# Patient Record
Sex: MALE | Race: WHITE | Employment: FULL TIME | URBAN - METROPOLITAN AREA
[De-identification: names, ages, dates, MRNs, and addresses within clinical notes are randomized per-mention and may not be internally consistent; named-entity substitution may affect disease eponyms.]

---

## 2023-09-10 ENCOUNTER — APPOINTMENT (OUTPATIENT)
Dept: GENERAL RADIOLOGY | Facility: HOSPITAL | Age: 33
End: 2023-09-10
Payer: COMMERCIAL

## 2023-09-10 ENCOUNTER — HOSPITAL ENCOUNTER (EMERGENCY)
Facility: HOSPITAL | Age: 33
Discharge: HOME | End: 2023-09-10
Payer: COMMERCIAL

## 2023-09-10 ENCOUNTER — APPOINTMENT (OUTPATIENT)
Dept: GENERAL RADIOLOGY | Facility: HOSPITAL | Age: 33
End: 2023-09-10
Attending: NURSE PRACTITIONER
Payer: COMMERCIAL

## 2023-09-10 VITALS
BODY MASS INDEX: 28.25 KG/M2 | TEMPERATURE: 97 F | HEIGHT: 67 IN | RESPIRATION RATE: 18 BRPM | SYSTOLIC BLOOD PRESSURE: 126 MMHG | DIASTOLIC BLOOD PRESSURE: 99 MMHG | OXYGEN SATURATION: 97 % | WEIGHT: 180 LBS | HEART RATE: 80 BPM
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
## 2023-09-10 DIAGNOSIS — S39.012A LUMBAR STRAIN, INITIAL ENCOUNTER: Primary | ICD-10-CM

## 2023-09-10 PROCEDURE — 73502 X-RAY EXAM HIP UNI 2-3 VIEWS: CPT

## 2023-09-10 PROCEDURE — 99283 EMERGENCY DEPT VISIT LOW MDM: CPT

## 2023-09-10 PROCEDURE — 99284 EMERGENCY DEPT VISIT MOD MDM: CPT

## 2023-09-10 PROCEDURE — 72100 X-RAY EXAM L-S SPINE 2/3 VWS: CPT | Performed by: NURSE PRACTITIONER

## 2023-09-10 RX ORDER — PREDNISONE 20 MG/1
60 TABLET ORAL ONCE
Status: COMPLETED | OUTPATIENT
Start: 2023-09-10 | End: 2023-09-10

## 2023-09-10 RX ORDER — CYCLOBENZAPRINE HCL 10 MG
10 TABLET ORAL 3 TIMES DAILY PRN
Qty: 20 TABLET | Refills: 0 | Status: SHIPPED | OUTPATIENT
Start: 2023-09-10 | End: 2023-09-17

## 2023-09-10 RX ORDER — PREDNISONE 20 MG/1
20 TABLET ORAL 2 TIMES DAILY
Qty: 10 TABLET | Refills: 0 | Status: SHIPPED | OUTPATIENT
Start: 2023-09-10 | End: 2023-09-15

## 2023-09-11 RX ORDER — CYCLOBENZAPRINE HCL 10 MG
10 TABLET ORAL 3 TIMES DAILY PRN
Qty: 20 TABLET | Refills: 0 | Status: SHIPPED | OUTPATIENT
Start: 2023-09-11 | End: 2023-09-18

## 2023-09-11 RX ORDER — PREDNISONE 20 MG/1
20 TABLET ORAL DAILY
Qty: 5 TABLET | Refills: 0 | Status: SHIPPED | OUTPATIENT
Start: 2023-09-11 | End: 2023-09-16

## 2023-09-11 NOTE — ED INITIAL ASSESSMENT (HPI)
S: patient presents to ED with left hip pain after bending down to get something patient heard a snap and now has considerable pain to left hip.      B: back surgery for multiple fx     A: patient appears non toxic but states that he was unable to walk after the incident    R: protocol

## 2024-10-14 ENCOUNTER — HOSPITAL ENCOUNTER (EMERGENCY)
Facility: HOSPITAL | Age: 34
Discharge: HOME | End: 2024-10-14
Attending: EMERGENCY MEDICINE
Payer: COMMERCIAL

## 2024-10-14 VITALS
RESPIRATION RATE: 18 BRPM | OXYGEN SATURATION: 98 % | HEART RATE: 78 BPM | TEMPERATURE: 99 F | DIASTOLIC BLOOD PRESSURE: 90 MMHG | SYSTOLIC BLOOD PRESSURE: 144 MMHG

## 2024-10-14 DIAGNOSIS — H16.8 KERATITIS OF RIGHT EYE DUE TO BACTERIA: Primary | ICD-10-CM

## 2024-10-14 DIAGNOSIS — B96.89 KERATITIS OF RIGHT EYE DUE TO BACTERIA: Primary | ICD-10-CM

## 2024-10-14 PROCEDURE — 99283 EMERGENCY DEPT VISIT LOW MDM: CPT

## 2024-10-14 RX ORDER — CIPROFLOXACIN HYDROCHLORIDE 3.5 MG/ML
1 SOLUTION/ DROPS TOPICAL
Qty: 5 ML | Refills: 0 | Status: SHIPPED | OUTPATIENT
Start: 2024-10-14 | End: 2024-10-24

## 2024-10-14 RX ORDER — TETRACAINE HYDROCHLORIDE 5 MG/ML
1 SOLUTION OPHTHALMIC ONCE
Status: COMPLETED | OUTPATIENT
Start: 2024-10-14 | End: 2024-10-14

## 2024-10-14 RX ORDER — CIPROFLOXACIN HYDROCHLORIDE 3.5 MG/ML
1 SOLUTION/ DROPS TOPICAL
Status: DISCONTINUED | OUTPATIENT
Start: 2024-10-14 | End: 2024-10-14

## 2024-10-15 NOTE — DISCHARGE INSTRUCTIONS
Please call and try to follow-up with ophthalmology tomorrow for further evaluation.  Use ciprofloxacin eyedrops as follows.    1 drop every 15 minutes for 6 hours, then 1 drop every 30 minutes x 24 hours, then 1 drop every hour x 24 hours, then 1 drop every 4 hours x 10 days.    A prescription for more Cipro antibiotic drops has been sent to your pharmacy in case you run out.

## 2024-10-15 NOTE — ED INITIAL ASSESSMENT (HPI)
Pt c/o of R eye redness and pain that started today when he woke up. Pt R eye sensitive to light. Denies using contacts. Denies injury.

## 2024-10-15 NOTE — ED PROVIDER NOTES
Patient Seen in: [facility] Emergency Department    History     Chief Complaint   Patient presents with    Eye Visual Problem       HPI    Patient presents to the ED complaining of redness and irritation to his right eye.  He states symptoms started 2 weeks ago but has worsened over time.  His eyes become more red and significantly more painful over time.  Much worse in the last 24 hours.  Denies any injury to the eye.  Denies any vision change.  Denies contact use.  Denies other complaints.    History reviewed.   Past Medical History:    Anxiety    Depression    Gastritis    Osteopenia    Ulcerative colitis (HCC)       History reviewed.   Past Surgical History:   Procedure Laterality Date    Appendectomy      Excis lumbar disk,one level           Medications :  Prescriptions Prior to Admission[1]     No family history on file.    Smoking Status:   Social History     Socioeconomic History    Marital status:    Tobacco Use    Smoking status: Never    Smokeless tobacco: Never   Substance and Sexual Activity    Alcohol use: Never    Drug use: Never       Constitutional and vital signs reviewed.      Social History and Family History elements reviewed from today, pertinent positives to the presenting problem noted.    Physical Exam     ED Triage Vitals   BP 10/14/24 2015 144/90   Pulse 10/14/24 2015 78   Resp 10/14/24 2015 18   Temp 10/14/24 2015 98.6 °F (37 °C)   Temp src 10/14/24 2015 Temporal   SpO2 10/14/24 2016 98 %   O2 Device 10/14/24 2016 None (Room air)       All measures to prevent infection transmission during my interaction with the patient were taken. Handwashing was performed prior to and after the exam.  Stethoscope and any equipment used during my examination was cleaned with super sani-cloth germicidal wipes following the exam.     Physical Exam  Vitals and nursing note reviewed.   Constitutional:       General: He is not in acute distress.     Appearance: Normal appearance. He is  well-developed.   HENT:      Head: Normocephalic and atraumatic.   Eyes:      Comments: Normal left eye.  Right eye with moderate injection of the conjunctiva.  Watery discharge present.  Normal vision.  Normal pressure in the right eye, 9.  Staining and slit-lamp exam shows fluorescein uptake over the central cornea with some mild sloughing of the superficial cornea.  This area measures 2 mm in diameter.  PERRL, EOM intact.  No significant pain with light exposure.   Neck:      Trachea: No tracheal deviation.   Cardiovascular:      Rate and Rhythm: Normal rate.   Pulmonary:      Effort: Pulmonary effort is normal. No respiratory distress.   Abdominal:      General: There is no distension.      Palpations: Abdomen is soft.   Musculoskeletal:         General: No deformity.   Skin:     General: Skin is warm and dry.   Neurological:      Mental Status: He is alert and oriented to person, place, and time. Mental status is at baseline.   Psychiatric:         Mood and Affect: Mood normal.         Behavior: Behavior normal.         ED Course      Labs Reviewed - No data to display    As Interpreted by me    Imaging Results Available and Reviewed while in ED: No results found.  ED Medications Administered:   Medications   ciprofloxacin (Ciloxan) 0.3 % ophthalmic solution 1 drop (has no administration in time range)   fluorescein sodium (Ful-Myrtle) 1 MG ophthalmic strip 1 strip (1 strip Ophthalmic Given by Other 10/14/24 2108)   tetracaine (Pontocaine) 0.5 % ophthalmic solution 1 drop (1 drop Ophthalmic Given 10/14/24 2108)         MDM     Vitals:    10/14/24 2015 10/14/24 2016   BP: 144/90    Pulse: 78    Resp: 18    Temp: 98.6 °F (37 °C)    TempSrc: Temporal    SpO2:  98%     *I personally reviewed and interpreted all ED vitals.    Pulse Ox: 98%, Room air, Normal     Differential Diagnosis/ Diagnostic Considerations: Corneal ulcer, corneal abrasion, bacterial keratitis, other    Complicating Factors: The patient already has  does not have a problem list on file. to contribute to the complexity of this ED evaluation.    Medical Decision Making  The patient presents to the ED with right eye pain, injection and tearing.  Vision and eye pressure are normal.  Concern for abrasion versus bacterial keratitis versus corneal ulcer.  Discussed antibiotic drops use and need for prompt outpatient ophthalmology follow-up.  Patient will return if worse.    Problems Addressed:  Keratitis of right eye due to bacteria: acute illness or injury    Risk  Prescription drug management.        Condition upon leaving the department: Stable    Disposition and Plan     Clinical Impression:  1. Keratitis of right eye due to bacteria        Disposition:  Discharge    Follow-up:  Eber Gonzalez MD  [address]  [address]  [facility] [address]  [phone]    Schedule an appointment as soon as possible for a visit in 1 day(s)        Medications Prescribed:  Current Discharge Medication List        START taking these medications    Details   ciprofloxacin 0.3 % Ophthalmic Solution Apply 1 drop to eye every 4 (four) hours while awake for 10 days.  Qty: 5 mL, Refills: 0                              [1] (Not in a hospital admission)

## (undated) NOTE — LETTER
Date & Time: 9/10/2023, 8:47 PM  Patient: Boone Sidhu  Encounter Provider(s):    CHRISTINA Menchaca       To Whom It May Concern:    Boone iSdhu was seen and treated in our department on 9/10/2023.  He can return to work with these limitations: avoid repetitive bending at the waist. .    If you have any questions or concerns, please do not hesitate to call.        _____________________________  Physician/AP[facility] Signature